# Patient Record
Sex: FEMALE | Race: WHITE | NOT HISPANIC OR LATINO | Employment: FULL TIME | URBAN - METROPOLITAN AREA
[De-identification: names, ages, dates, MRNs, and addresses within clinical notes are randomized per-mention and may not be internally consistent; named-entity substitution may affect disease eponyms.]

---

## 2022-05-24 ENCOUNTER — HOSPITAL ENCOUNTER (EMERGENCY)
Facility: CLINIC | Age: 52
Discharge: HOME | End: 2022-05-24
Attending: EMERGENCY MEDICINE | Admitting: EMERGENCY MEDICINE
Payer: COMMERCIAL

## 2022-05-24 VITALS
TEMPERATURE: 97.7 F | RESPIRATION RATE: 10 BRPM | HEART RATE: 75 BPM | BODY MASS INDEX: 24.48 KG/M2 | WEIGHT: 133 LBS | SYSTOLIC BLOOD PRESSURE: 107 MMHG | HEIGHT: 62 IN | DIASTOLIC BLOOD PRESSURE: 68 MMHG | OXYGEN SATURATION: 99 %

## 2022-05-24 DIAGNOSIS — R55 VASOVAGAL SYNCOPE: ICD-10-CM

## 2022-05-24 DIAGNOSIS — R11.2 NAUSEA AND VOMITING, INTRACTABILITY OF VOMITING NOT SPECIFIED, UNSPECIFIED VOMITING TYPE: ICD-10-CM

## 2022-05-24 LAB
ANION GAP SERPL CALCULATED.3IONS-SCNC: 8 MMOL/L (ref 3–14)
ATRIAL RATE - MUSE: 57 BPM
BASOPHILS # BLD AUTO: 0 10E3/UL (ref 0–0.2)
BASOPHILS NFR BLD AUTO: 0 %
BUN SERPL-MCNC: 13 MG/DL (ref 7–30)
CALCIUM SERPL-MCNC: 8.8 MG/DL (ref 8.5–10.1)
CHLORIDE BLD-SCNC: 109 MMOL/L (ref 94–109)
CO2 SERPL-SCNC: 21 MMOL/L (ref 20–32)
CREAT SERPL-MCNC: 0.64 MG/DL (ref 0.52–1.04)
DIASTOLIC BLOOD PRESSURE - MUSE: NORMAL MMHG
EOSINOPHIL # BLD AUTO: 0 10E3/UL (ref 0–0.7)
EOSINOPHIL NFR BLD AUTO: 0 %
ERYTHROCYTE [DISTWIDTH] IN BLOOD BY AUTOMATED COUNT: 13.2 % (ref 10–15)
GFR SERPL CREATININE-BSD FRML MDRD: >90 ML/MIN/1.73M2
GLUCOSE BLD-MCNC: 115 MG/DL (ref 70–99)
HCT VFR BLD AUTO: 38.5 % (ref 35–47)
HGB BLD-MCNC: 12.7 G/DL (ref 11.7–15.7)
IMM GRANULOCYTES # BLD: 0 10E3/UL
IMM GRANULOCYTES NFR BLD: 1 %
INTERPRETATION ECG - MUSE: NORMAL
LYMPHOCYTES # BLD AUTO: 1.1 10E3/UL (ref 0.8–5.3)
LYMPHOCYTES NFR BLD AUTO: 14 %
MCH RBC QN AUTO: 30.8 PG (ref 26.5–33)
MCHC RBC AUTO-ENTMCNC: 33 G/DL (ref 31.5–36.5)
MCV RBC AUTO: 93 FL (ref 78–100)
MONOCYTES # BLD AUTO: 0.4 10E3/UL (ref 0–1.3)
MONOCYTES NFR BLD AUTO: 5 %
NEUTROPHILS # BLD AUTO: 6.5 10E3/UL (ref 1.6–8.3)
NEUTROPHILS NFR BLD AUTO: 80 %
NRBC # BLD AUTO: 0 10E3/UL
NRBC BLD AUTO-RTO: 0 /100
P AXIS - MUSE: 54 DEGREES
PLATELET # BLD AUTO: 238 10E3/UL (ref 150–450)
POTASSIUM BLD-SCNC: 3.8 MMOL/L (ref 3.4–5.3)
PR INTERVAL - MUSE: 142 MS
QRS DURATION - MUSE: 74 MS
QT - MUSE: 480 MS
QTC - MUSE: 467 MS
R AXIS - MUSE: 51 DEGREES
RBC # BLD AUTO: 4.13 10E6/UL (ref 3.8–5.2)
SODIUM SERPL-SCNC: 138 MMOL/L (ref 133–144)
SYSTOLIC BLOOD PRESSURE - MUSE: NORMAL MMHG
T AXIS - MUSE: 33 DEGREES
TROPONIN I SERPL HS-MCNC: <3 NG/L
VENTRICULAR RATE- MUSE: 57 BPM
WBC # BLD AUTO: 8.1 10E3/UL (ref 4–11)

## 2022-05-24 PROCEDURE — 258N000003 HC RX IP 258 OP 636: Performed by: EMERGENCY MEDICINE

## 2022-05-24 PROCEDURE — 82310 ASSAY OF CALCIUM: CPT | Performed by: EMERGENCY MEDICINE

## 2022-05-24 PROCEDURE — 85025 COMPLETE CBC W/AUTO DIFF WBC: CPT | Performed by: EMERGENCY MEDICINE

## 2022-05-24 PROCEDURE — 36415 COLL VENOUS BLD VENIPUNCTURE: CPT | Performed by: EMERGENCY MEDICINE

## 2022-05-24 PROCEDURE — 96374 THER/PROPH/DIAG INJ IV PUSH: CPT

## 2022-05-24 PROCEDURE — 93005 ELECTROCARDIOGRAM TRACING: CPT

## 2022-05-24 PROCEDURE — 250N000011 HC RX IP 250 OP 636: Performed by: EMERGENCY MEDICINE

## 2022-05-24 PROCEDURE — 99284 EMERGENCY DEPT VISIT MOD MDM: CPT | Mod: 25

## 2022-05-24 PROCEDURE — 84484 ASSAY OF TROPONIN QUANT: CPT | Performed by: EMERGENCY MEDICINE

## 2022-05-24 PROCEDURE — 80048 BASIC METABOLIC PNL TOTAL CA: CPT | Performed by: EMERGENCY MEDICINE

## 2022-05-24 PROCEDURE — 96361 HYDRATE IV INFUSION ADD-ON: CPT

## 2022-05-24 RX ORDER — SODIUM CHLORIDE 9 MG/ML
INJECTION, SOLUTION INTRAVENOUS CONTINUOUS
Status: DISCONTINUED | OUTPATIENT
Start: 2022-05-24 | End: 2022-05-24 | Stop reason: HOSPADM

## 2022-05-24 RX ORDER — ONDANSETRON 4 MG/1
4 TABLET, ORALLY DISINTEGRATING ORAL EVERY 8 HOURS PRN
Qty: 10 TABLET | Refills: 0 | Status: SHIPPED | OUTPATIENT
Start: 2022-05-24 | End: 2022-05-27

## 2022-05-24 RX ORDER — ONDANSETRON 2 MG/ML
4 INJECTION INTRAMUSCULAR; INTRAVENOUS EVERY 30 MIN PRN
Status: DISCONTINUED | OUTPATIENT
Start: 2022-05-24 | End: 2022-05-24 | Stop reason: HOSPADM

## 2022-05-24 RX ADMIN — SODIUM CHLORIDE 1000 ML: 9 INJECTION, SOLUTION INTRAVENOUS at 16:14

## 2022-05-24 RX ADMIN — SODIUM CHLORIDE 1000 ML: 9 INJECTION, SOLUTION INTRAVENOUS at 16:15

## 2022-05-24 RX ADMIN — ONDANSETRON 4 MG: 2 INJECTION INTRAMUSCULAR; INTRAVENOUS at 16:15

## 2022-05-24 NOTE — ED NOTES
Rn at bedside, care assumed of patient. Pt reports still feeling nausea and dizzy. Pt has no other needs at this time. Family at bedside. Pt awaiting to be seen by ED MD

## 2022-05-24 NOTE — ED TRIAGE NOTES
Patient was on flight from [place] and when the plane started to descend in [place]  Patient started feeling dizzy, nausea, lightheaded and numbness in extremities.  Patient did have a syncopal episode on the flight. Got off the plane and took dramamine which did not help. EMS was called, patient was hypotensive SBP 70's. Patient given 4mg zofran and fluids prior to arrival.

## 2022-05-24 NOTE — ED PROVIDER NOTES
History     Chief Complaint:    Syncope      HPI   Marilu Trinh is a 51 year old female who presents with syncopal episode that occurred on a plane ride.  She notes she is from [facility] and was planning on flying to [place] but had to stop in [place].  She noted the ride here was a bit rough and bumpy towards the landing and she got quite nauseated and then had syncope after feeling the nausea and heat sensation.  She then was very nauseated and had significant emesis.  She was able to get off the plane was trying to get positioned for the next flight.  Then the nausea and vomiting persisted and developed tingling in her arms and legs below her knees and then had carpopedal spasms.  She had something prior to this before on a boat trip.  She has no significant past medical history other than motion sickness.  She does not take any medications.  She does not smoke or use alcohol or drugs.  Denies chest pain or pressure or vertiginous symptoms.    Review of Systems  + nausea and vomiting, numbness, syncope  - chest pain, shortness of breath, abdominal pain, headache  All other systems negative    Allergies:      Amoxicillin      Medications:      ondansetron (ZOFRAN ODT) 4 MG ODT tab        Past Medical History:        No past medical history on file.  There are no problems to display for this patient.       Past Surgical History:        No past surgical history on file.    Family History:        No family history on file.    Social History:    No Ref-Primary, Physician  The patient presents to the ED with  and daughter    Physical Exam     Patient Vitals for the past 24 hrs:   BP Temp Temp src Pulse Resp SpO2 Height Weight   05/24/22 1745 -- -- -- 75 10 99 % -- --   05/24/22 1730 107/68 -- -- 73 20 98 % -- --   05/24/22 1715 115/59 -- -- -- -- -- -- --   05/24/22 1700 99/68 -- -- 68 13 100 % -- --   05/24/22 1645 107/67 -- -- 62 22 99 % -- --   05/24/22 1630 94/49 -- -- 64 12 100 % -- --  "  05/24/22 1615 107/61 -- -- 67 14 100 % -- --   05/24/22 1600 98/61 -- -- 62 19 99 % -- --   05/24/22 1545 105/64 -- -- 64 14 99 % -- --   05/24/22 1530 100/61 -- -- 59 20 99 % -- --   05/24/22 1500 100/67 -- -- 62 14 100 % -- --   05/24/22 1445 98/61 -- -- 56 18 99 % -- --   05/24/22 1430 102/61 -- -- 53 19 98 % -- --   05/24/22 1415 103/63 -- -- 55 13 100 % -- --   05/24/22 1402 102/76 97.7  F (36.5  C) Oral 68 16 100 % 1.575 m (5' 2\") 60.3 kg (133 lb)       Physical Exam  GENERAL: well developed, pleasant  HEAD: atraumatic  EYES: pupils reactive, extraocular muscles intact, conjunctivae normal  ENT:  mucus membranes moist  NECK:  trachea midline, normal range of motion  RESPIRATORY: no tachypnea, breath sounds clear to auscultation   CVS: normal S1/S2, no murmurs, intact distal pulses  ABDOMEN: soft, nontender, nondistention  MUSCULOSKELETAL: no deformities  SKIN: warm and dry, no acute rashes or ulceration  NEURO: GCS 15, cranial nerves intact, alert and oriented x3  PSYCH:  Mood/affect normal        Emergency Department Course        ECG results from 05/24/22   EKG 12-lead, tracing only     Value    Systolic Blood Pressure     Diastolic Blood Pressure     Ventricular Rate 57    Atrial Rate 57    RI Interval 142    QRS Duration 74        QTc 467    P Axis 54    R AXIS 51    T Axis 33    Interpretation ECG      Sinus bradycardia  Otherwise normal ECG  No previous ECGs available  Confirmed by GENERATED REPORT, COMPUTER ([id number]),  CHAPARRITA MIRAMONTES ([id number]) on 5/24/2022 4:00:31 PM         Imaging:  No orders to display     Report per radiology    Laboratory:  Labs Ordered and Resulted from Time of ED Arrival to Time of ED Departure   BASIC METABOLIC PANEL - Abnormal       Result Value    Sodium 138      Potassium 3.8      Chloride 109      Carbon Dioxide (CO2) 21      Anion Gap 8      Urea Nitrogen 13      Creatinine 0.64      Calcium 8.8      Glucose 115 (*)     GFR Estimate >90     TROPONIN I - Normal    " Troponin I High Sensitivity <3     CBC WITH PLATELETS AND DIFFERENTIAL    WBC Count 8.1      RBC Count 4.13      Hemoglobin 12.7      Hematocrit 38.5      MCV 93      MCH 30.8      MCHC 33.0      RDW 13.2      Platelet Count 238      % Neutrophils 80      % Lymphocytes 14      % Monocytes 5      % Eosinophils 0      % Basophils 0      % Immature Granulocytes 1      NRBCs per 100 WBC 0      Absolute Neutrophils 6.5      Absolute Lymphocytes 1.1      Absolute Monocytes 0.4      Absolute Eosinophils 0.0      Absolute Basophils 0.0      Absolute Immature Granulocytes 0.0      Absolute NRBCs 0.0         Procedures:  na    Emergency Department Course:             Reviewed:    I reviewed nursing notes, vitals and past medical history    Assessments:   I obtained history and examined the patient as noted above.    I rechecked the patient and explained findings.       Consults:   na         Interventions:    Medications   0.9% sodium chloride BOLUS (0 mLs Intravenous Stopped 5/24/22 1803)     Followed by   0.9% sodium chloride BOLUS (0 mLs Intravenous Stopped 5/24/22 1803)       Disposition:  The patient was discharged to home.     Impression & Plan            CMS Diagnoses:        Medical Decision Making:  Patient presents with syncope in the setting of getting quite nauseated during a rough flight landing.  She has had this in the past with motion sickness.  She then had ongoing nausea and vomiting in sounds to have had a panic attack with carpopedal spasms and peripheral paresthesias.  She was given 2 L of fluids with Zofran and feels remarkably better.  Discussed outpatient management with her and things to help to prevent this in the future.    Critical Care time:  none    Covid-19  Marilu Trinh was evaluated during a global COVID-19 pandemic, which necessitated consideration that the patient might be at risk for infection with the SARS-CoV-2 virus that causes COVID-19.   Applicable protocols for evaluation were  followed during the patient's care.   COVID-19 was considered as part of the patient's evaluation.    Diagnosis:    ICD-10-CM    1. Vasovagal syncope  R55    2. Nausea and vomiting, intractability of vomiting not specified, unspecified vomiting type  R11.2        Discharge Medications:  Discharge Medication List as of 5/24/2022  6:03 PM      START taking these medications    Details   ondansetron (ZOFRAN ODT) 4 MG ODT tab Take 1 tablet (4 mg) by mouth every 8 hours as needed for nausea, Disp-10 tablet, R-0, E-Prescribe               Scribe Disclosure:  Barrington MURRAY MD, am serving as a scribe at 4:02 PM on 5/24/2022 to document services personally performed by Barrington Correia MD based on my observations and the provider's statements to me.      Barrington Correia MD  05/24/22 [id number]

## 2022-05-24 NOTE — ED NOTES
EDT ambulated pt to the bathroom. Pt still feeling dizzy and nauseous but able to walk with stand by assistance